# Patient Record
Sex: MALE | Race: OTHER | NOT HISPANIC OR LATINO | URBAN - METROPOLITAN AREA
[De-identification: names, ages, dates, MRNs, and addresses within clinical notes are randomized per-mention and may not be internally consistent; named-entity substitution may affect disease eponyms.]

---
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
## 2024-11-25 NOTE — ED PROVIDER NOTE - FAMILY HISTORY
DIAGNOTIC STUDIES:   Orders Placed This Encounter   Procedures    [52799] Foot Min 3V     Order Specific Question:   Are you Pregnant?     Answer:   No    [facility] - [facility] - [facility]     Referral Priority:   Routine     Referral Type:   Eval and Treat     Referral Reason:   Specialty Services Required     Requested Specialty:   Physical Therapy     Number of Visits Requested:   1          RIGHT FOOT X-rays, NWB 3 views, AP/LAT/OBL completed 11/25/2024 AT [facility]    X-rays reveal Osseous: Healing fracture, right midfoot fracture, medial cuneiform fracture, with a history of Lisfranc injury and Lisfranc instability: Overall alignment, looks quite good.  Anatomic line, seen to the right midfoot, forefoot, hindfoot.  No acute fracture//there are no dislocation or subluxation noted. Overall alignment is  acceptable. Soft tissue swelling is mild noted. No osteolytic or osteoblastic lesions noted. Mineralization suggests disuse osteopenia. Degenerative changes are none noted. Calcified vessels are NOT present.     I have personally reviewed the images of the above study. The interpretation of this study is my professional opinion      ASSESSMENT: Tasha Gray is doing  well thus far postoperative..  She is doing quite well      ICD-10-CM    1. Closed fracture of right foot with routine healing, subsequent encounter  S92.901D [05962] Foot Min 3V     [facility] - [facility]           PLAN:     Incisions:  Sutures removed at a prior visit.  DVT Prophylaxis : none  Pain medications : Tylenol PRN  Weight Bearing : Start weightbearing as tolerated in short CAM boot with crutches initially for 1 week. Then advance to fully weightbearing as tolerated in short CAM boot.   Order for formal physical therapy for the right foot. Start HEP right foot/ankle range of motion and strengthening.   Anticipate CT scan of the right foot in 8 weeks   Return 3 
No pertinent family history in first degree relatives